# Patient Record
Sex: FEMALE | Race: WHITE | NOT HISPANIC OR LATINO | URBAN - NONMETROPOLITAN AREA
[De-identification: names, ages, dates, MRNs, and addresses within clinical notes are randomized per-mention and may not be internally consistent; named-entity substitution may affect disease eponyms.]

---

## 2019-09-10 ENCOUNTER — IMPORTED ENCOUNTER (OUTPATIENT)
Dept: URBAN - NONMETROPOLITAN AREA CLINIC 1 | Facility: CLINIC | Age: 44
End: 2019-09-10

## 2019-09-10 PROCEDURE — 92015 DETERMINE REFRACTIVE STATE: CPT

## 2019-09-10 PROCEDURE — 92310 CONTACT LENS FITTING OU: CPT

## 2019-09-10 PROCEDURE — 92014 COMPRE OPH EXAM EST PT 1/>: CPT

## 2019-09-10 NOTE — PATIENT DISCUSSION
*Myopia & Astigmatism:.-	  Discussed refractive status with patient in detail. *Gls RX:.-	  A new spectacle prescription was created and dispensed today. *Cl fit:.-	  Contact lenses fit well appropriately moving and re-centering with each blink. DM s -Stressed the importance of keeping blood sugars under control and regular visits with PCP. -Explained the possible effects of poorly controlled diabetes and the damage that diabetes can cause to ocular health. -Pt instructed to contact our office with any vision changes.; Dr's Notes: PA at [address] unsure of name

## 2021-04-14 ENCOUNTER — IMPORTED ENCOUNTER (OUTPATIENT)
Dept: URBAN - NONMETROPOLITAN AREA CLINIC 1 | Facility: CLINIC | Age: 46
End: 2021-04-14

## 2021-04-14 PROBLEM — E11.9: Noted: 2021-04-14

## 2021-04-14 PROBLEM — H52.13: Noted: 2021-04-14

## 2021-04-14 PROBLEM — H52.223: Noted: 2021-04-14

## 2021-04-14 PROCEDURE — 92015 DETERMINE REFRACTIVE STATE: CPT

## 2021-04-14 PROCEDURE — 92014 COMPRE OPH EXAM EST PT 1/>: CPT

## 2021-04-14 PROCEDURE — 92310 CONTACT LENS FITTING OU: CPT

## 2021-04-14 NOTE — PATIENT DISCUSSION
DM s -Stressed the importance of keeping blood sugars under control blood pressure under control and weight normalization and regular visits with PCP. -Explained the possible effects of poorly controlled diabetes and the damage that diabetes can cause to ocular health. -Patient to check HgbA1C.-Pt instructed to contact our office with any vision changes. Myopia-Discussed diagnosis with patient. -Explained that people who are myopic are at a higher risk for developing RD/RT and reviewed associated S&S.-Pt to contact our office if symptoms develop. Astigmatism-Discussed diagnosis with patient. Updated spec Rx given. Recommend lens that will provide comfort as well as protect safety and health of eyes. CL wear-CLs fit and center well.-Stressed that patient should not sleep in CL. -Updated CL Rx given. -CL care and precautions given.; Dr's Notes: PA at [address] unsure of name

## 2022-04-09 ASSESSMENT — VISUAL ACUITY
OS_SC: 20/30-1
OD_SC: 20/40
OD_SC: 20/30-1
OS_SC: 20/30-1
OS_CC: J2
OD_SC: J1
OS_CC: J1
OD_SC: J1
OS_SC: 20/40
OD_SC: 20/30-1
OD_CC: J2
OS_SC: J1
OD_CC: J1
OS_SC: J1

## 2022-04-09 ASSESSMENT — TONOMETRY
OS_IOP_MMHG: 13
OD_IOP_MMHG: 14
OS_IOP_MMHG: 14
OD_IOP_MMHG: 14

## 2022-04-28 ENCOUNTER — COMPREHENSIVE EXAM (OUTPATIENT)
Dept: RURAL CLINIC 1 | Facility: CLINIC | Age: 47
End: 2022-04-28

## 2022-04-28 DIAGNOSIS — E11.9: ICD-10-CM

## 2022-04-28 DIAGNOSIS — H52.223: ICD-10-CM

## 2022-04-28 DIAGNOSIS — H52.13: ICD-10-CM

## 2022-04-28 PROCEDURE — 92014 COMPRE OPH EXAM EST PT 1/>: CPT

## 2022-04-28 ASSESSMENT — VISUAL ACUITY
OS_CC: 20/50+2
OU_CC: 20/30+1
OD_CC: 20/30-2
OD_PH: 20/30+2
OS_PH: 20/40

## 2022-04-28 ASSESSMENT — TONOMETRY
OD_IOP_MMHG: 14
OS_IOP_MMHG: 14

## 2024-02-07 ENCOUNTER — ESTABLISHED PATIENT (OUTPATIENT)
Dept: RURAL CLINIC 1 | Facility: CLINIC | Age: 49
End: 2024-02-07

## 2024-02-07 DIAGNOSIS — H52.223: ICD-10-CM

## 2024-02-07 DIAGNOSIS — E11.9: ICD-10-CM

## 2024-02-07 DIAGNOSIS — H52.13: ICD-10-CM

## 2024-02-07 PROCEDURE — 92310-E CONTACT LENS FITTING ESTABLISH PATIENT

## 2024-02-07 PROCEDURE — 92014 COMPRE OPH EXAM EST PT 1/>: CPT

## 2024-02-07 PROCEDURE — 92015 DETERMINE REFRACTIVE STATE: CPT

## 2024-02-07 ASSESSMENT — VISUAL ACUITY
OD_CC: 20/30-2
OS_CC: 20/25-2

## 2025-07-14 ENCOUNTER — COMPREHENSIVE EXAM (OUTPATIENT)
Age: 50
End: 2025-07-14

## 2025-07-14 DIAGNOSIS — H52.4: ICD-10-CM

## 2025-07-14 DIAGNOSIS — H52.13: ICD-10-CM

## 2025-07-14 DIAGNOSIS — E11.9: ICD-10-CM

## 2025-07-14 DIAGNOSIS — H52.223: ICD-10-CM

## 2025-07-14 PROCEDURE — 92014 COMPRE OPH EXAM EST PT 1/>: CPT

## 2025-07-14 PROCEDURE — 92310-1 LEVEL 1 SOFT LENS UPDATE

## 2025-07-14 PROCEDURE — 92015 DETERMINE REFRACTIVE STATE: CPT
